# Patient Record
Sex: FEMALE | Race: WHITE | NOT HISPANIC OR LATINO | Employment: FULL TIME | URBAN - METROPOLITAN AREA
[De-identification: names, ages, dates, MRNs, and addresses within clinical notes are randomized per-mention and may not be internally consistent; named-entity substitution may affect disease eponyms.]

---

## 2018-03-29 LAB — PREGNANCY TEST URINE (HISTORICAL): NEGATIVE

## 2018-08-01 ENCOUNTER — OFFICE VISIT (OUTPATIENT)
Dept: SURGERY | Facility: CLINIC | Age: 43
End: 2018-08-01
Payer: COMMERCIAL

## 2018-08-01 VITALS
BODY MASS INDEX: 44.76 KG/M2 | SYSTOLIC BLOOD PRESSURE: 133 MMHG | RESPIRATION RATE: 18 BRPM | HEART RATE: 78 BPM | WEIGHT: 228 LBS | DIASTOLIC BLOOD PRESSURE: 78 MMHG | HEIGHT: 60 IN

## 2018-08-01 DIAGNOSIS — Z98.84 BARIATRIC SURGERY STATUS: Primary | ICD-10-CM

## 2018-08-01 PROCEDURE — 99213 OFFICE O/P EST LOW 20 MIN: CPT | Performed by: SPECIALIST

## 2018-08-01 RX ORDER — FLUTICASONE PROPIONATE 50 MCG
2 SPRAY, SUSPENSION (ML) NASAL EVERY 24 HOURS
COMMUNITY

## 2018-08-01 RX ORDER — HYDROXYZINE PAMOATE 25 MG/1
CAPSULE ORAL
Refills: 3 | COMMUNITY
Start: 2018-07-05

## 2018-08-01 RX ORDER — CLOBETASOL PROPIONATE 0.5 MG/G
CREAM TOPICAL
COMMUNITY
Start: 2018-05-30

## 2018-08-01 RX ORDER — CELECOXIB 200 MG/1
200 CAPSULE ORAL
COMMUNITY
Start: 2018-02-26

## 2018-08-01 RX ORDER — CLINDAMYCIN PHOSPHATE 10 MG/ML
SOLUTION TOPICAL
Refills: 2 | COMMUNITY
Start: 2018-07-03

## 2018-08-01 RX ORDER — CELECOXIB 200 MG/1
200 CAPSULE ORAL DAILY
Refills: 1 | COMMUNITY
Start: 2018-07-05

## 2018-08-01 RX ORDER — BENZOYL PEROXIDE 50 MG/ML
LIQUID TOPICAL
Refills: 2 | COMMUNITY
Start: 2018-07-02

## 2018-08-01 RX ORDER — CLOBETASOL PROPIONATE 0.5 MG/G
CREAM TOPICAL
Refills: 1 | COMMUNITY
Start: 2018-07-05

## 2018-08-01 RX ORDER — CETIRIZINE HYDROCHLORIDE 10 MG/1
10 TABLET ORAL
COMMUNITY
Start: 2017-09-22 | End: 2018-09-22

## 2018-08-01 RX ORDER — PSEUDOEPHEDRINE HCL 30 MG
4 TABLET ORAL DAILY
Refills: 5 | COMMUNITY
Start: 2018-07-05

## 2018-08-01 RX ORDER — GLYCERIN ADULT
SUPPOSITORY, RECTAL RECTAL
Refills: 0 | COMMUNITY
Start: 2018-05-09

## 2018-08-01 NOTE — PROGRESS NOTES
Today for postop visit status post laparoscopic sleeve gastrectomy 3/29/18  At that time she weighed 269 lb  Today she weighs 228 lb  She states she feels good  She is eating mainly protein  She does get hungry  She eats about a half a cup to a cup at a time  Her diet consists of once again mainly protein she is peanut butter check-in  She takes multiple vitamin daily  She also takes some calcium  She has regular bowel movements  Physical exam middle-aged obese  female awake alert no distress    Abdomen obese soft nontender she has redundant skin present secondary to weight loss  Incisions healing well no evidence of hernia  She would like some laboratory values to check her levels and we will give her a prescription for this      Impression doing well status post sleeve gastrectomy    Plan continue same increase activity and exercise check labs bring back in 3 months

## 2022-06-23 ENCOUNTER — EVALUATION (OUTPATIENT)
Dept: PHYSICAL THERAPY | Age: 47
End: 2022-06-23
Payer: COMMERCIAL

## 2022-06-23 DIAGNOSIS — Z96.659 HISTORY OF REVISION OF TOTAL KNEE ARTHROPLASTY: Primary | ICD-10-CM

## 2022-06-23 PROCEDURE — 97161 PT EVAL LOW COMPLEX 20 MIN: CPT | Performed by: PHYSICAL THERAPIST

## 2022-06-23 NOTE — PROGRESS NOTES
PT Evaluation     Today's date: 2022  Patient name: Francy Sepulveda  : 1975  MRN: [id number]  Referring provider: Ashlyn Blank, *  Dx:   Encounter Diagnosis     ICD-10-CM    1  History of revision of total knee arthroplasty  Z96 659                   Assessment  Assessment details: Pt reports to PT s/p L TKA and revision  Pt has limitations in LE PROM and strength which contribute to difficulty with ADLs  Pt would benefit from aquatic PT with goal of addressing limitations and improving function with ADLs     Impairments: abnormal coordination, abnormal gait, abnormal muscle firing, abnormal muscle tone, abnormal or restricted ROM, abnormal movement, activity intolerance, impaired physical strength, lacks appropriate home exercise program, pain with function and poor body mechanics    Goals  In 4 weeks pt will:  -Be independent with phase I of HEP  -Increase LE strength by 1/2 grade  -Increase LE ROM by 10 degrees    By discharge pt will:  -Be independent with Phase II of HEP  -Demonstrate full LE strength  -Demonstrate full LE ROM  -Report minimal pain with ADLs    Plan  Patient would benefit from: skilled physical therapy  Planned therapy interventions: abdominal trunk stabilization, aquatic therapy, joint mobilization, manual therapy, motor coordination training, muscle pump exercises, neuromuscular re-education, patient education, strengthening, stretching, therapeutic activities, therapeutic exercise, fine motor coordination training, functional ROM exercises, home exercise program and body mechanics training  Frequency: 2x week  Duration in weeks: 8  Plan of Care beginning date: 2022  Plan of Care expiration date: 2022  Treatment plan discussed with: patient and PTA        Subjective    Objective     Passive Range of Motion   Left Knee   Prone flexion: 105 degrees     Right Knee   Prone flexion: 110 degrees     Strength/Myotome Testing     Left Hip   Planes of Motion Abduction: 3+    Right Hip   Planes of Motion   Abduction: 3+    Left Knee   Flexion: 3+  Extension: 3+    Right Knee   Flexion: 3+  Extension: 3+    Left Ankle/Foot   Dorsiflexion: 3+  Plantar flexion: 3+    Right Ankle/Foot   Dorsiflexion: 3+  Plantar flexion: 3+             Precautions: N/A      Manuals                                                                 Neuro Re-Ed                                                                                                        Ther Ex                                                                                                                     Ther Activity                                       Gait Training                                       Modalities

## 2022-06-23 NOTE — LETTER
2022    Jim George MD  [address] [facility] [place] [address]    Patient: Maria A Stratton   YOB: 1975   Date of Visit: 2022     Encounter Diagnosis     ICD-10-CM    1  History of revision of total knee arthroplasty  A70 990        Dear Dr Bijal Steele: Thank you for your recent referral of Maria A Stratton  Please review the attached evaluation summary from Nedra's recent visit  Please verify that you agree with the plan of care by signing the attached order  If you have any questions or concerns, please do not hesitate to call  I sincerely appreciate the opportunity to share in the care of one of your patients and hope to have another opportunity to work with you in the near future  Sincerely,    Kwan Durbin, PT      Referring Provider:      I certify that I have read the below Plan of Care and certify the need for these services furnished under this plan of treatment while under my care  Jim George MD  [address] [facility] [place] [address]  Via Fax: [phone]          PT Evaluation     Today's date: 2022  Patient name: Maria A Stratton  : 1975  MRN: [id number]  Referring provider: Mel Fraser, *  Dx:   Encounter Diagnosis     ICD-10-CM    1  History of revision of total knee arthroplasty  Z96 659                   Assessment  Assessment details: Pt reports to PT s/p L TKA and revision  Pt has limitations in LE PROM and strength which contribute to difficulty with ADLs  Pt would benefit from aquatic PT with goal of addressing limitations and improving function with ADLs     Impairments: abnormal coordination, abnormal gait, abnormal muscle firing, abnormal muscle tone, abnormal or restricted ROM, abnormal movement, activity intolerance, impaired physical strength, lacks appropriate home exercise program, pain with function and poor body mechanics    Goals  In 4 weeks pt will:  -Be independent with phase I of HEP  -Increase LE strength by 1/2 grade  -Increase LE ROM by 10 degrees    By discharge pt will:  -Be independent with Phase II of HEP  -Demonstrate full LE strength  -Demonstrate full LE ROM  -Report minimal pain with ADLs    Plan  Patient would benefit from: skilled physical therapy  Planned therapy interventions: abdominal trunk stabilization, aquatic therapy, joint mobilization, manual therapy, motor coordination training, muscle pump exercises, neuromuscular re-education, patient education, strengthening, stretching, therapeutic activities, therapeutic exercise, fine motor coordination training, functional ROM exercises, home exercise program and body mechanics training  Frequency: 2x week  Duration in weeks: 8  Plan of Care beginning date: 6/23/2022  Plan of Care expiration date: 8/18/2022  Treatment plan discussed with: patient and PTA        Subjective    Objective     Passive Range of Motion   Left Knee   Prone flexion: 105 degrees     Right Knee   Prone flexion: 110 degrees     Strength/Myotome Testing     Left Hip   Planes of Motion   Abduction: 3+    Right Hip   Planes of Motion   Abduction: 3+    Left Knee   Flexion: 3+  Extension: 3+    Right Knee   Flexion: 3+  Extension: 3+    Left Ankle/Foot   Dorsiflexion: 3+  Plantar flexion: 3+    Right Ankle/Foot   Dorsiflexion: 3+  Plantar flexion: 3+             Precautions: N/A      Manuals                                                                 Neuro Re-Ed                                                                                                        Ther Ex                                                                                                                     Ther Activity                                       Gait Training                                       Modalities

## 2022-06-28 ENCOUNTER — OFFICE VISIT (OUTPATIENT)
Dept: PHYSICAL THERAPY | Age: 47
End: 2022-06-28
Payer: COMMERCIAL

## 2022-06-28 DIAGNOSIS — Z96.659 HISTORY OF REVISION OF TOTAL KNEE ARTHROPLASTY: Primary | ICD-10-CM

## 2022-06-28 PROCEDURE — 97113 AQUATIC THERAPY/EXERCISES: CPT | Performed by: PHYSICAL THERAPIST

## 2022-06-28 NOTE — PROGRESS NOTES
Daily Note     Today's date: 2022  Patient name: Russell Soto  : 1975  MRN: [id number]  Referring provider: Jammie Obrien, *  Dx:   Encounter Diagnosis     ICD-10-CM    1  History of revision of total knee arthroplasty  Z96 659                   Subjective:       Objective: See treatment diary below      Assessment: Tolerated treatment well  Patient demonstrated fatigue post treatment, would benefit from continued PT and pt tolerated first treatment well      Plan: Continue per plan of care  Progress treatment as tolerated         Precautions: N/A      Manuals                                                                 Neuro Re-Ed                                                                                                        Ther Ex             Laps 5/5            SS 5x            HS stretch 3x30s ea            Bike-2 floats 5'            Std hip abd/flex x30 ea            Step ups 2x10 ea            Squats on step 20x            HR/TR x30 ea            LAQ/HS curl with weight Med blue 3x10 ea                                                                             Ther Activity                                       Gait Training                                       Modalities

## 2022-06-30 ENCOUNTER — OFFICE VISIT (OUTPATIENT)
Dept: PHYSICAL THERAPY | Age: 47
End: 2022-06-30
Payer: COMMERCIAL

## 2022-06-30 DIAGNOSIS — Z96.659 HISTORY OF REVISION OF TOTAL KNEE ARTHROPLASTY: Primary | ICD-10-CM

## 2022-06-30 PROCEDURE — 97113 AQUATIC THERAPY/EXERCISES: CPT | Performed by: PHYSICAL THERAPIST

## 2022-06-30 NOTE — PROGRESS NOTES
Daily Note     Today's date: 2022  Patient name: Emerita Zuniga  : 1975  MRN: [id number]  Referring provider: Wellington Alpers, *  Dx:   Encounter Diagnosis     ICD-10-CM    1  History of revision of total knee arthroplasty  Z96 659                   Subjective: Pt reports minimal soreness after last session      Objective: See treatment diary below      Assessment: Tolerated treatment well  Patient demonstrated fatigue post treatment and would benefit from continued PT      Plan: Continue per plan of care  Progress treatment as tolerated         Precautions: N/A      Manuals                                                                Neuro Re-Ed                                                                                                        Ther Ex  Med blue AW           Laps            SS 5x 5x           HS stretch 3x30s ea 3x30s ea           Bike-2 floats 5' 5'           Std hip abd/flex x30 ea x30 ea           Step ups 2x10 ea 2x10 ea           Squats on step 20x 20x           HR/TR x30 ea x30 ea           LAQ/HS curl with weight Med blue 3x10 ea  3x10 ea           Paddles-ext/add  x20                                                                Ther Activity                                       Gait Training                                       Modalities

## 2022-07-05 ENCOUNTER — OFFICE VISIT (OUTPATIENT)
Dept: PHYSICAL THERAPY | Age: 47
End: 2022-07-05
Payer: COMMERCIAL

## 2022-07-05 DIAGNOSIS — Z96.659 HISTORY OF REVISION OF TOTAL KNEE ARTHROPLASTY: Primary | ICD-10-CM

## 2022-07-05 PROCEDURE — 97113 AQUATIC THERAPY/EXERCISES: CPT | Performed by: PHYSICAL THERAPIST

## 2022-07-05 NOTE — PROGRESS NOTES
Daily Note     Today's date: 2022  Patient name: Vane Stone  : 1975  MRN: [id number]  Referring provider: Charlene Su, *  Dx:   Encounter Diagnosis     ICD-10-CM    1  History of revision of total knee arthroplasty  Z96 659                   Subjective: Pt reports no problems with ankle weights in last session      Objective: See treatment diary below      Assessment: Tolerated treatment well  Patient demonstrated fatigue post treatment and would benefit from continued PT      Plan: Continue per plan of care  Progress treatment as tolerated         Precautions: N/A      Manuals                                                               Neuro Re-Ed                                                                                                        Ther Ex  Med blue AW Med blue AW          Laps           SS 5x 5x 5x          HS stretch 3x30s ea 3x30s ea 3x30s ea          Bike-2 floats 5' 5' 5'          Std hip abd/flex x30 ea x30 ea x30 ea          Step ups 2x10 ea 2x10 ea 2x10 ea          Squats on step 20x 20x 20x          HR/TR x30 ea x30 ea x30 ea          LAQ/HS curl with weight Med blue 3x10 ea  3x10 ea 3x10 ea          Paddles-ext/add  x20  x30                                                              Ther Activity                                       Gait Training                                       Modalities

## 2022-07-07 ENCOUNTER — OFFICE VISIT (OUTPATIENT)
Dept: PHYSICAL THERAPY | Age: 47
End: 2022-07-07
Payer: COMMERCIAL

## 2022-07-07 DIAGNOSIS — Z96.659 HISTORY OF REVISION OF TOTAL KNEE ARTHROPLASTY: Primary | ICD-10-CM

## 2022-07-07 PROCEDURE — 97113 AQUATIC THERAPY/EXERCISES: CPT | Performed by: SPECIALIST/TECHNOLOGIST

## 2022-07-07 NOTE — PROGRESS NOTES
Daily Note     Today's date: 2022  Patient name: Den Cedillo  : 1975  MRN: [id number]  Referring provider: Justa Scott, *  Dx:   Encounter Diagnosis     ICD-10-CM    1  History of revision of total knee arthroplasty  Z96 659                   Subjective: Pt reports overall she is doing well- no adverse effects from aquatic PT  Objective: See treatment diary below    Assessment: Pt challenged with increased reps and dynamic progressions introduced this visit  Pt would benefit from continued PROM and CKC stregnthening exercises  Tolerated treatment well  Patient demonstrated fatigue post treatment, exhibited good technique with therapeutic exercises and would benefit from continued PT    Plan: Continue per plan of care  Progress treatment as tolerated         Precautions: N/A      Manuals                                                              Neuro Re-Ed                                                                                                        Ther Ex  Med blue AW Med blue AW [place] Blue Cuff         Laps  5         SS 5x 5x 5x 5x         HS stretch 3x30s ea 3x30s ea 3x30s ea 3x30s         Knee Flx stretch w strap    3x30s         Bike-2 floats 5' 5' 5'          Std hip abd/flex x30 ea x30 ea x30 ea x30 ea         Step ups 2x10 ea 2x10 ea 2x10 ea 3x10 ea         Squats on step 20x 20x 20x 3x10         HR/TR x30 ea x30 ea x30 ea 3x10         LAQ/HS curl with weight Med blue 3x10 ea  3x10 ea 3x10 ea 3x10          Paddles-ext/add  x20  x30           JJ, XC Ski    2 min ea                                                 Ther Activity                                       Gait Training                                       Modalities

## 2022-07-11 ENCOUNTER — OFFICE VISIT (OUTPATIENT)
Dept: PHYSICAL THERAPY | Age: 47
End: 2022-07-11
Payer: COMMERCIAL

## 2022-07-11 DIAGNOSIS — Z96.659 HISTORY OF REVISION OF TOTAL KNEE ARTHROPLASTY: Primary | ICD-10-CM

## 2022-07-11 PROCEDURE — 97113 AQUATIC THERAPY/EXERCISES: CPT | Performed by: SPECIALIST/TECHNOLOGIST

## 2022-07-11 NOTE — PROGRESS NOTES
Daily Note     Today's date: 2022  Patient name: Zhang Bautista  : 1975  MRN: [id number]  Referring provider: Glenna Nevarez, *  Dx:   Encounter Diagnosis     ICD-10-CM    1  History of revision of total knee arthroplasty  Z96 659                   Subjective: Pt denies adverse effects from dynamic progressions added to POC last visit  Objective: See treatment diary below    Assessment: Pt challenged with current reps and resistance  Tolerated treatment well  Patient demonstrated fatigue post treatment, exhibited good technique with therapeutic exercises and would benefit from continued PT    Plan: Continue per plan of care  Progress treatment as tolerated         Precautions: N/A    Manuals                                                             Neuro Re-Ed                                                                                                        Ther Ex  Med blue AW Med blue AW [place] Blue Cuff Royal Blue Cuffs        Laps  5 5x        SS 5x 5x 5x 5x 5x        HS stretch 3x30s ea 3x30s ea 3x30s ea 3x30s 3x30s        Knee Flx stretch w strap    3x30s 3x30s        Bike-2 floats 5' 5' 5'          Std hip abd/flex x30 ea x30 ea x30 ea x30 ea x30 ea        Step ups 2x10 ea 2x10 ea 2x10 ea 3x10 ea 3x10 ea        Squats on step 20x 20x 20x 3x10 3x10        HR/TR x30 ea x30 ea x30 ea 3x10 3x10        LAQ/HS curl with weight Med blue 3x10 ea  3x10 ea 3x10 ea 3x10  3x10        Paddles-ext/add  x20  x30           JJ, XC Ski    2 min ea  2 min ea                                               Ther Activity                                       Gait Training                                       Modalities

## 2022-07-12 ENCOUNTER — APPOINTMENT (OUTPATIENT)
Dept: PHYSICAL THERAPY | Age: 47
End: 2022-07-12
Payer: COMMERCIAL

## 2022-07-14 ENCOUNTER — OFFICE VISIT (OUTPATIENT)
Dept: PHYSICAL THERAPY | Age: 47
End: 2022-07-14
Payer: COMMERCIAL

## 2022-07-14 DIAGNOSIS — Z96.659 HISTORY OF REVISION OF TOTAL KNEE ARTHROPLASTY: Primary | ICD-10-CM

## 2022-07-14 PROCEDURE — 97113 AQUATIC THERAPY/EXERCISES: CPT | Performed by: SPECIALIST/TECHNOLOGIST

## 2022-07-14 NOTE — PROGRESS NOTES
Daily Note     Today's date: 2022  Patient name: Paola Cabral  : 1975  MRN: [id number]  Referring provider: Ruben Mcclure, *  Dx:   Encounter Diagnosis     ICD-10-CM    1  History of revision of total knee arthroplasty  Z96 659                   Subjective: Pt reports some knee soreness attributed to arthritis, does not think its from aquatic PT exercises  Objective: See treatment diary below    Assessment: Pt able to increase LE cuff wt resistance with all aquatic therex this visit  Tolerated treatment well  Patient demonstrated fatigue post treatment, exhibited good technique with therapeutic exercises and would benefit from continued PT    Plan: Continue per plan of care  Progress treatment as tolerated         Precautions: N/A    Manuals                                                            Neuro Re-Ed                                                                                                        Ther Ex  Med blue AW Med blue AW [place] Blue Cuff Royal Blue Cuffs  Red Cuff Wts       Laps  5 5x 5x       SS 5x 5x 5x 5x 5x 5x       HS stretch 3x30s ea 3x30s ea 3x30s ea 3x30s 3x30s 3x30s       Knee Flx stretch w strap    3x30s 3x30s 3x30s       Bike-2 floats 5' 5' 5'          Std hip abd/flex x30 ea x30 ea x30 ea x30 ea x30 ea x30       Step ups 2x10 ea 2x10 ea 2x10 ea 3x10 ea 3x10 ea 3x10       Squats on step 20x 20x 20x 3x10 3x10 3x10       HR/TR x30 ea x30 ea x30 ea 3x10 3x10 3x10       LAQ/HS curl with weight Med blue 3x10 ea  3x10 ea 3x10 ea 3x10  3x10 3x10       Paddles-ext/add  x20  x30           JJ, XC Ski    2 min ea  2 min ea 2 min ea                                              Ther Activity                                       Gait Training                                       Modalities

## 2022-07-18 ENCOUNTER — OFFICE VISIT (OUTPATIENT)
Dept: PHYSICAL THERAPY | Age: 47
End: 2022-07-18
Payer: COMMERCIAL

## 2022-07-18 DIAGNOSIS — Z96.659 HISTORY OF REVISION OF TOTAL KNEE ARTHROPLASTY: Primary | ICD-10-CM

## 2022-07-18 PROCEDURE — 97113 AQUATIC THERAPY/EXERCISES: CPT | Performed by: SPECIALIST/TECHNOLOGIST

## 2022-07-18 NOTE — PROGRESS NOTES
Daily Note     Today's date: 2022  Patient name: Bernadette Mccormack  : 1975  MRN: [id number]  Referring provider: Joanne Miranda, *  Dx:   Encounter Diagnosis     ICD-10-CM    1  History of revision of total knee arthroplasty  Z96 659                   Subjective: Pt reports mild low/mid back soreness over the weekend  Objective: See treatment diary below    Assessment: Pt remains challenged with current reps and increased resistance, increased CKC exercise capacity in pool vs land  Tolerated treatment well  Patient demonstrated fatigue post treatment, exhibited good technique with therapeutic exercises and would benefit from continued PT    Plan: Continue per plan of care  Progress treatment as tolerated         Precautions: N/A    Manuals                                                           Neuro Re-Ed                                                                                                        Ther Ex  Med blue AW Med blue AW [place] Blue Cuff Royal Blue Cuffs  Red Cuff Wts Red cuff wts      Laps  5 5x 5x 5x      SS 5x 5x 5x 5x 5x 5x 5x      HS stretch 3x30s ea 3x30s ea 3x30s ea 3x30s 3x30s 3x30s 3x30s      Knee Flx stretch w strap    3x30s 3x30s 3x30s 3x30s      Bike-2 floats 5' 5' 5'    5'      Std hip abd/flex x30 ea x30 ea x30 ea x30 ea x30 ea x30 x30      Step ups 2x10 ea 2x10 ea 2x10 ea 3x10 ea 3x10 ea 3x10 30x      Squats on step 20x 20x 20x 3x10 3x10 3x10 30x      HR/TR x30 ea x30 ea x30 ea 3x10 3x10 3x10 30x      LAQ/HS curl with weight Med blue 3x10 ea  3x10 ea 3x10 ea 3x10  3x10 3x10 30x      Paddles-ext/add  x20  x30           JJ, XC Ski    2 min ea  2 min ea 2 min ea 2 min ea                                             Ther Activity                                       Gait Training                                       Modalities

## 2022-07-19 ENCOUNTER — APPOINTMENT (OUTPATIENT)
Dept: PHYSICAL THERAPY | Age: 47
End: 2022-07-19
Payer: COMMERCIAL

## 2022-07-21 ENCOUNTER — OFFICE VISIT (OUTPATIENT)
Dept: PHYSICAL THERAPY | Age: 47
End: 2022-07-21
Payer: COMMERCIAL

## 2022-07-21 DIAGNOSIS — Z96.659 HISTORY OF REVISION OF TOTAL KNEE ARTHROPLASTY: Primary | ICD-10-CM

## 2022-07-21 PROCEDURE — 97113 AQUATIC THERAPY/EXERCISES: CPT

## 2022-07-21 NOTE — PROGRESS NOTES
Daily Note     Today's date: 2022  Patient name: Debbie Dowling  : 1975  MRN: [id number]  Referring provider: Jonnie Mercado, *  Dx:   Encounter Diagnosis     ICD-10-CM    1  History of revision of total knee arthroplasty  Z96 659                   Subjective: Pt reports pain at 6/10 at R knee  Notes pain is worse with increased WB  Pt notes she very little pain when in pool exercising  Also notes B shoulders are starting to hurt and she has a chronic problem with that  Objective: See treatment diary below    Assessment: Pt remains challenged with current reps and increased resistance, increased CKC exercise capacity in pool vs land  Tolerated treatment well  Pain atimes when knee is flexed but it does linger after exercise  Patient demonstrated fatigue post treatment, exhibited good technique with therapeutic exercises and would benefit from continued PT    Plan: Continue per plan of care  Progress treatment as tolerated         Precautions: N/A    Manuals                                                          Neuro Re-Ed                                                                                                        Ther Ex  Med blue AW Med blue AW [place] Blue Cuff Royal Blue Cuffs  Red Cuff Wts Red cuff wts Red cuff wts     Laps  5 5 5 5x 5x 5x 5x     SS 5x 5x 5x 5x 5x 5x 5x 5x     HS stretch 3x30s ea 3x30s ea 3x30s ea 3x30s 3x30s 3x30s 3x30s 3x30s     Knee Flx stretch w strap    3x30s 3x30s 3x30s 3x30s 3x 30s     Bike-2 floats 5' 5' 5'    5' 5m     Std hip abd/flex x30 ea x30 ea x30 ea x30 ea x30 ea x30 x30 x30     Step ups 2x10 ea 2x10 ea 2x10 ea 3x10 ea 3x10 ea 3x10 30x x30     Squats on step 20x 20x 20x 3x10 3x10 3x10 30x x30     HR/TR x30 ea x30 ea x30 ea 3x10 3x10 3x10 30x x30     LAQ/HS curl with weight Med blue 3x10 ea  3x10 ea 3x10 ea 3x10  3x10 3x10 30x 30x     Paddles-ext/add  x20  x30           JJ, XC Ski    2 min ea  2 min ea 2 min ea 2 min ea 2 min ea                                            Ther Activity                                       Gait Training                                       Modalities

## 2022-07-25 ENCOUNTER — OFFICE VISIT (OUTPATIENT)
Dept: PHYSICAL THERAPY | Age: 47
End: 2022-07-25
Payer: COMMERCIAL

## 2022-07-25 DIAGNOSIS — Z96.659 HISTORY OF REVISION OF TOTAL KNEE ARTHROPLASTY: Primary | ICD-10-CM

## 2022-07-25 PROCEDURE — 97113 AQUATIC THERAPY/EXERCISES: CPT | Performed by: SPECIALIST/TECHNOLOGIST

## 2022-07-25 NOTE — PROGRESS NOTES
Daily Note     Today's date: 2022  Patient name: Aurelia Barboza  : 1975  MRN: [id number]  Referring provider: Lorena Marquez, *  Dx:   Encounter Diagnosis     ICD-10-CM    1  History of revision of total knee arthroplasty  Z96 659                   Subjective: Pt reports [place] yesterday and could ambulate further distances with improved endurance compared to prior to starting aquatic PT  Objective: See treatment diary below    Assessment: Pt able to increase resistance with all therex this visit and progress knee flexion stretch to quad stretch due to improved ROM and soft tissue extensibility  Tolerated treatment well  Patient demonstrated fatigue post treatment, exhibited good technique with therapeutic exercises and would benefit from continued PT    Plan: Continue per plan of care  Progress treatment as tolerated         Precautions: N/A    Manuals                                         Neuro Re-Ed                                                                        Ther Ex [place] Blue Cuffs  Red Cuff Wts Red cuff wts Red cuff wts  Purple Cuff Wts    Laps 5x 5x 5x 5x 5x    SS 5x 5x 5x 5x 5x    HS stretch 3x30s 3x30s 3x30s 3x30s 3x30s    Quad Stretch stretch w strap 3x30s 3x30s 3x30s 3x 30s 3x30s    Bike-2 floats   5' 5m 5 min w Red DB     Std hip abd/flex x30 ea x30 x30 x30 x30 ea    Step ups 3x10 ea 3x10 30x x30 x30 ea    Squats on step 3x10 3x10 30x x30 x30    HR/TR 3x10 3x10 30x x30 x30    LAQ/HS curl with weight 3x10 3x10 30x 30x 30x    Paddles-ext/add          JJ, XC Ski 2 min ea 2 min ea 2 min ea 2 min ea 2 min ea, purple LE Cuffs, Red DB's                               Ther Activity                           Gait Training                           Modalities

## 2022-07-26 ENCOUNTER — APPOINTMENT (OUTPATIENT)
Dept: PHYSICAL THERAPY | Age: 47
End: 2022-07-26
Payer: COMMERCIAL

## 2022-07-28 ENCOUNTER — OFFICE VISIT (OUTPATIENT)
Dept: PHYSICAL THERAPY | Age: 47
End: 2022-07-28
Payer: COMMERCIAL

## 2022-07-28 DIAGNOSIS — Z96.659 HISTORY OF REVISION OF TOTAL KNEE ARTHROPLASTY: Primary | ICD-10-CM

## 2022-07-28 PROCEDURE — 97113 AQUATIC THERAPY/EXERCISES: CPT

## 2022-07-28 NOTE — PROGRESS NOTES
Daily Note     Today's date: 2022  Patient name: Tristian Leonardo  : 1975  MRN: [id number]  Referring provider: Kenan Nobles, *  Dx:   Encounter Diagnosis     ICD-10-CM    1  History of revision of total knee arthroplasty  Z96 659                   Subjective: Patient stated that she has been more active, but the knee pain starts at the end of the day  Objective: See treatment diary below      Assessment: Tolerated treatment well  Patient has good recall of TE, with good execution  Appropriately challenged with increased weight for PREs  She continues to respond well to modified self stretching  Generalized muscle fatigue noted, more so with hip abd  Continued PT would be beneficial to improve function           Plan: Continue per plan of care         Precautions: N/A    Manuals                                        Neuro Re-Ed                                                                        Ther Ex [place] Blue Cuffs  Red Cuff Wts Red cuff wts Red cuff wts  Purple Cuff Wts Purple Cuff Wts   Laps 5x 5x 5x 5x 5x 5x   SS 5x 5x 5x 5x 5x 5x   HS stretch 3x30s 3x30s 3x30s 3x30s 3x30s 3x30s   Quad Stretch stretch w strap 3x30s 3x30s 3x30s 3x 30s 3x30s 3x30s   Bike-2 floats   5' 5m 5 min w Red DB     Std hip abd/flex x30 ea x30 x30 x30 x30 x30 ea   Step ups 3x10 ea 3x10 30x x30 x30 ea x30 ea   Squats on step 3x10 3x10 30x x30 x30 x30   HR/TR 3x10 3x10 30x x30 x30 x30   LAQ/HS curl with weight 3x10 3x10 30x 30x 30x 30x   Paddles-ext/add          JJ, XC Ski 2 min ea 2 min ea 2 min ea 2 min ea 2 min ea, purple LE Cuffs, Red DB's 2 min ea, purple LE Cuffs, Red DB's                              Ther Activity                           Gait Training                           Modalities

## 2022-08-04 ENCOUNTER — OFFICE VISIT (OUTPATIENT)
Dept: PHYSICAL THERAPY | Age: 47
End: 2022-08-04
Payer: COMMERCIAL

## 2022-08-04 DIAGNOSIS — Z96.659 HISTORY OF REVISION OF TOTAL KNEE ARTHROPLASTY: Primary | ICD-10-CM

## 2022-08-04 PROCEDURE — 97110 THERAPEUTIC EXERCISES: CPT | Performed by: SPECIALIST/TECHNOLOGIST

## 2022-08-04 NOTE — PROGRESS NOTES
Daily Note     Today's date: 2022  Patient name: Marion Perry  : 1975  MRN: [id number]  Referring provider: Viola Proctor, *  Dx:   Encounter Diagnosis     ICD-10-CM    1  History of revision of total knee arthroplasty  Z96 659                   Subjective: Pt reports R>L knee pain recently  L knee doing fairly well overall  Objective: See treatment diary below    Assessment: Pt challenged with land progressions this visit  Good target muscle activation with assigned therex, mild-moderate muscular fatigue  Tolerated treatment well  Patient demonstrated fatigue post treatment, exhibited good technique with therapeutic exercises and would benefit from continued PT    Plan: Continue per plan of care  Progress treatment as tolerated         Precautions: N/A    Manuals                                               Neuro Re-Ed                                                                                Ther Ex Red cuff wts  Purple Cuff Wts Purple Cuff Wts       Laps 5x 5x 5x       SS 5x 5x 5x       HS stretch 3x30s 3x30s 3x30s       Quad Stretch stretch w strap 3x 30s 3x30s 3x30s       Bike-2 floats 5m 5 min w Red DB         Std hip abd/flex x30 x30 x30 ea       Step ups x30 x30 ea x30 ea       Squats on step x30 x30 x30       HR/TR x30 x30 x30       LAQ/HS curl with weight 30x 30x 30x       Paddles-ext/add           JJ, XC Ski 2 min ea 2 min ea, purple LE Cuffs, Red DB's 2 min ea, purple LE Cuffs, Red DB's                 LAND          Bike    10'      ProStretch    5x30s ea      Prone Quad     5x30s ea      Sled Push/Pull    25# 4x      Sled Pull    25# 4x      Step Ups    6" 20x ea      Slant Board Squats    20x      Cybex Hip Abd    30# 20x      Cybex Knee Flx    35# 20x      Cybex Knee Ext    20# 20x                Ther Activity                              Gait Training                              Modalities

## 2022-08-11 ENCOUNTER — OFFICE VISIT (OUTPATIENT)
Dept: PHYSICAL THERAPY | Age: 47
End: 2022-08-11
Payer: COMMERCIAL

## 2022-08-11 DIAGNOSIS — Z96.659 HISTORY OF REVISION OF TOTAL KNEE ARTHROPLASTY: Primary | ICD-10-CM

## 2022-08-11 PROCEDURE — 97110 THERAPEUTIC EXERCISES: CPT | Performed by: SPECIALIST/TECHNOLOGIST

## 2022-08-11 NOTE — PROGRESS NOTES
Daily Note     Today's date: 2022  Patient name: Navjot Juares  : 1975  MRN: [id number]  Referring provider: Korina Childs, *  Dx:   Encounter Diagnosis     ICD-10-CM    1  History of revision of total knee arthroplasty  Z96 659                   Subjective: Pt reports generalized soreness/fatigue from packing in preparation to move in the coming weeks  Objective: See treatment diary below    Assessment: Pt demonstrates moderate muscular fatigue with resistance and reps assigned this visit, consistent strength gains  Tolerated treatment well  Patient demonstrated fatigue post treatment, exhibited good technique with therapeutic exercises and would benefit from continued PT    Plan: Continue per plan of care  Progress treatment as tolerated         Precautions: N/A    Manuals                                              Neuro Re-Ed                                                                                Ther Ex Red cuff wts  Purple Cuff Wts Purple Cuff Wts       Laps 5x 5x 5x       SS 5x 5x 5x       HS stretch 3x30s 3x30s 3x30s       Quad Stretch stretch w strap 3x 30s 3x30s 3x30s       Bike-2 floats 5m 5 min w Red DB         Std hip abd/flex x30 x30 x30 ea       Step ups x30 x30 ea x30 ea       Squats on step x30 x30 x30       HR/TR x30 x30 x30       LAQ/HS curl with weight 30x 30x 30x       Paddles-ext/add           JJ, XC Ski 2 min ea 2 min ea, purple LE Cuffs, Red DB's 2 min ea, purple LE Cuffs, Red DB's                 LAND          Bike    10' 10'     ProStretch    5x30s ea 4x30s     Prone Quad     5x30s ea HEP     Sled Push/Pull    25# 4x      Sled Pull    25# 4x 25# 5x 50ft     Step Ups    6" 20x ea 8" 30x ea     Slant Board Squats    20x 20x     Cybex Hip Abd    30# 20x 30# 30x     Cybex Knee Flx    35# 20x 35# 30x     Cybex Knee Ext    20# 20x 20# 30x     SL Leg Press     40# 3x10     Ther Activity                              Gait Training                              Modalities

## 2022-08-15 ENCOUNTER — APPOINTMENT (OUTPATIENT)
Dept: PHYSICAL THERAPY | Age: 47
End: 2022-08-15
Payer: COMMERCIAL

## 2022-08-16 ENCOUNTER — OFFICE VISIT (OUTPATIENT)
Dept: PHYSICAL THERAPY | Age: 47
End: 2022-08-16
Payer: COMMERCIAL

## 2022-08-16 DIAGNOSIS — Z96.659 HISTORY OF REVISION OF TOTAL KNEE ARTHROPLASTY: Primary | ICD-10-CM

## 2022-08-16 PROCEDURE — 97110 THERAPEUTIC EXERCISES: CPT | Performed by: SPECIALIST/TECHNOLOGIST

## 2022-08-16 NOTE — PROGRESS NOTES
Daily Note     Today's date: 2022  Patient name: Diana Garrido  : 1975  MRN: [id number]  Referring provider: Amanda Pennington, *  Dx:   Encounter Diagnosis     ICD-10-CM    1  History of revision of total knee arthroplasty  Z96 659                   Subjective: Pt reports she has been active packing to move, knee pain fairly well controlled with increased activity  Objective: See treatment diary below    Assessment: Pt challenged with increased resistance with therex this visit, consistent strength gains  Tolerated treatment well  Patient demonstrated fatigue post treatment, exhibited good technique with therapeutic exercises and would benefit from continued PT    Plan: Continue per plan of care  Progress treatment as tolerated  Progress treament per protocol        Precautions: N/A    Manuals                                             Neuro Re-Ed                                                                                Ther Ex Red cuff wts  Purple Cuff Wts Purple Cuff Wts       Laps 5x 5x 5x       SS 5x 5x 5x       HS stretch 3x30s 3x30s 3x30s       Quad Stretch stretch w strap 3x 30s 3x30s 3x30s       Bike-2 floats 5m 5 min w Red DB         Std hip abd/flex x30 x30 x30 ea       Step ups x30 x30 ea x30 ea       Squats on step x30 x30 x30       HR/TR x30 x30 x30       LAQ/HS curl with weight 30x 30x 30x       Paddles-ext/add           JJ, XC Ski 2 min ea 2 min ea, purple LE Cuffs, Red DB's 2 min ea, purple LE Cuffs, Red DB's                 LAND          Bike    10' 10' 10'    ProStretch    5x30s ea 4x30s 4x30s    Prone Quad     5x30s ea HEP     Sled Push/Pull    25# 4x      Sled Pull    25# 4x 25# 5x 50ft 35# 4x 50ft    Step Ups    6" 20x ea 8" 30x ea 8" 30x ea    Slant Board Squats    20x 20x 30x    Cybex Hip Abd    30# 20x 30# 30x 35# 3x15    Cybex Knee Flx    35# 20x 35# 30x 40# 3x15    Cybex Knee Ext    20# 20x 20# 30x 25# 3x10    SL Leg Press     40# 3x10 40# 3x10 seat reclined    Ther Activity                              Gait Training                              Modalities

## 2022-08-18 ENCOUNTER — OFFICE VISIT (OUTPATIENT)
Dept: PHYSICAL THERAPY | Age: 47
End: 2022-08-18
Payer: COMMERCIAL

## 2022-08-18 DIAGNOSIS — Z96.659 HISTORY OF REVISION OF TOTAL KNEE ARTHROPLASTY: Primary | ICD-10-CM

## 2022-08-18 PROCEDURE — 97164 PT RE-EVAL EST PLAN CARE: CPT | Performed by: SPECIALIST/TECHNOLOGIST

## 2022-08-18 PROCEDURE — 97110 THERAPEUTIC EXERCISES: CPT | Performed by: SPECIALIST/TECHNOLOGIST

## 2022-08-18 NOTE — PROGRESS NOTES
Daily Note     Today's date: 2022  Patient name: Raya Melchor  : 1975  MRN: [id number]  Referring provider: Prisca Lala, *  Dx:   Encounter Diagnosis     ICD-10-CM    1  History of revision of total knee arthroplasty  Z96 659                   Subjective: Pt reports overall she is doing well, moving in the coming weeks- today is last tx session  Objective: See treatment diary below    Goals  In 4 weeks pt will:  -Be independent with phase I of HEP met  -Increase LE strength by 1/2 grade -met  -Increase LE ROM by 10 degrees -met    By discharge pt will:  -Be independent with Phase II of HEP -met  -Demonstrate full LE strength -met  -Demonstrate full LE ROM -met  -Report minimal pain with ADLs -met    Assessment: Pt has met therapeutic goals and is appropriate for discharge at this time  Tolerated treatment well  Plan: Discharge as pt is moving        Precautions: N/A    Manuals                                            Neuro Re-Ed                                                                                Ther Ex Red cuff wts  Purple Cuff Wts Purple Cuff Wts       Laps 5x 5x 5x       SS 5x 5x 5x       HS stretch 3x30s 3x30s 3x30s       Quad Stretch stretch w strap 3x 30s 3x30s 3x30s       Bike-2 floats 5m 5 min w Red DB         Std hip abd/flex x30 x30 x30 ea       Step ups x30 x30 ea x30 ea       Squats on step x30 x30 x30       HR/TR x30 x30 x30       LAQ/HS curl with weight 30x 30x 30x       Paddles-ext/add           JJ, XC Ski 2 min ea 2 min ea, purple LE Cuffs, Red DB's 2 min ea, purple LE Cuffs, Red DB's                 LAND          Bike    10' 10' 10' 10'   ProStretch    5x30s ea 4x30s 4x30s 4x30s   Prone Quad     5x30s ea HEP     Sled Push/Pull    25# 4x      Sled Pull    25# 4x 25# 5x 50ft 35# 4x 50ft 25# 5x 50ft   Step Ups    6" 20x ea 8" 30x ea 8" 30x ea 8" 30x ea   Slant Board Squats    20x 20x 30x 30x   Cybex Hip Abd 30# 20x 30# 30x 35# 3x15 35# 3x15   Cybex Knee Flx    35# 20x 35# 30x 40# 3x15 40# 3x15   Cybex Knee Ext    20# 20x 20# 30x 25# 3x10 25# 3x10   SL Leg Press     40# 3x10 40# 3x10 seat reclined 40# 3x10 seat reclined   Ther Activity                              Gait Training                              Modalities